# Patient Record
Sex: MALE | Race: WHITE
[De-identification: names, ages, dates, MRNs, and addresses within clinical notes are randomized per-mention and may not be internally consistent; named-entity substitution may affect disease eponyms.]

---

## 2018-05-04 ENCOUNTER — HOSPITAL ENCOUNTER (EMERGENCY)
Dept: HOSPITAL 25 - ED | Age: 28
LOS: 1 days | Discharge: HOME | End: 2018-05-05
Payer: COMMERCIAL

## 2018-05-04 DIAGNOSIS — M54.5: Primary | ICD-10-CM

## 2018-05-04 DIAGNOSIS — Z87.891: ICD-10-CM

## 2018-05-04 PROCEDURE — 99284 EMERGENCY DEPT VISIT MOD MDM: CPT

## 2018-05-05 VITALS — SYSTOLIC BLOOD PRESSURE: 128 MMHG | DIASTOLIC BLOOD PRESSURE: 65 MMHG

## 2018-05-05 NOTE — ED
Virgil RIVERA Nikita, scribed for Devi Snyder MD on 05/04/18 at 2245 .





Back Pain





- HPI Summary


HPI Summary: 


This patient is a 27 year old M BIBA to ED with a chief complaint of lower back 

pain since 3 days ago. The CC is described as sharp, non-radiating, and 

worsened since onset. The patient rates the pain 10/10 in severity. Symptoms 

aggravated by movement and walking. Symptoms alleviated by nothing (patient 

took 2 ibuprofen in the AM, 2 Advil 1 hour ago). Patient denies urinary problems

, focal weakness, and numbness. In 2010, patient reports similar pain due to 

strenuous activity but has resolved then.





- History of Current Complaint


Chief Complaint: EDBackInjuryPain


Stated Complaint: BACK PAIN


Time Seen by Provider: 05/04/18 22:27


Hx Obtained From: Patient


Onset/Duration: Sudden Onset, Lasting Days - 3 days ago, Still Present, Worse 

Since - onset


Onset/Duration: Started Days Ago - 3 days ago, Still Present, Worse Since - 

onset


Timing: Constant, Lasting Days - 3 days ago


Back Pain Location: Is Discrete @ - lower back


Severity Initially: Severe


Severity Currently: Severe


Pain Intensity: 10


Pain Scale Used: 0-10 Numeric


Character: Sharp


Aggravating Symptom(s): Movement, Walking


Alleviating Symptom(s): Nothing - patient took 2 ibuprofen in the AM, 2 Advil 1 

hour ago


Associated Signs And Symptoms: Positive: Other - Patient denies urinary problems

, focal weakness, and numbness.





- Allergies/Home Medications


Allergies/Adverse Reactions: 


 Allergies











Allergy/AdvReac Type Severity Reaction Status Date / Time


 


No Known Allergies Allergy   Verified 05/04/18 22:24














PMH/Surg Hx/FS Hx/Imm Hx


Endocrine/Hematology History: 


   Denies: Hx Diabetes


Cardiovascular History: 


   Denies: Hx Coronary Artery Disease, Hx Hypertension


Musculoskeletal History: Reports: Hx of Fracture(s) - leg





- Immunization History


Date of Tetanus Vaccine: utd


Date of Influenza Vaccine: fall 2017


Infectious Disease History: No


Infectious Disease History: 


   Denies: Traveled Outside the [place] in Last 30 Days





- Family History


Known Family History: Positive: Hypertension


   Negative: Cardiac Disease, Diabetes





- Social History


Alcohol Use: Weekly


Substance Use Type: Reports: Marijuana


Substance Use Comment - Amount & Last Used: last night


Smoking Status (MU): Former Smoker





Review of Systems


Positive: no symptoms reported


Positive: Other - lower back pain


Neurological: Other - denies focal weakness or numbness


All Other Systems Reviewed And Are Negative: Yes





Physical Exam





- Summary


Physical Exam Summary: 


VITAL SIGNS: Reviewed.


GENERAL: ~Patient is a well-developed and nourished MALE who is lying 

comfortable in the stretcher. Patient is not in any acute respiratory distress.


HEAD AND FACE: No signs of trauma. No ecchymosis, hematomas or skull 

depressions. No sinus tenderness.


EYES: PERRLA, EOMI x 2, No injected conjunctiva, no nystagmus.


EARS: Hearing grossly intact. Ear canals and tympanic membranes are within 

normal limits.


MOUTH: Oropharynx within normal limits.


NECK: Supple, trachea is midline, no adenopathy, no JVD, no carotid bruit, no c-

spine tenderness, neck with full ROM.


CHEST: Symmetric, no tenderness at palpation


LUNGS: Clear to auscultation bilaterally. No wheezing or crackles.


CVS: Regular rate and rhythm, S1 and S2 present, no murmurs or gallops 

appreciated.


ABDOMEN: Soft, non-tender. No signs of distention. No rebound, no guarding, and 

no masses palpated. Bowel sounds are normal.


EXTREMITIES: No edema, no cyanosis or clubbing. Bilateral straight leg test is 

30 degrees on both sides. 


MUSCULOSKELETAL: No lumbar cervical tenderness.


NEURO: Alert and oriented x 3. No acute neurological deficits. Speech is normal 

and follows commands.


SKIN: Dry and warm


Triage Information Reviewed: Yes


Vital Signs On Initial Exam: 


 Initial Vitals











Temp Pulse Resp BP Pulse Ox


 


 99.4 F   65   16   105/51   100 


 


 05/04/18 22:23  05/04/18 22:23  05/04/18 22:23  05/04/18 22:23  05/04/18 22:23











Vital Signs Reviewed: Yes





Diagnostics





- Vital Signs


 Vital Signs











  Temp Pulse Resp BP Pulse Ox


 


 05/04/18 22:31   55   105/51  100


 


 05/04/18 22:27   68    100


 


 05/04/18 22:23  99.4 F  65  16  105/51  100














- Laboratory


Lab Statement: Any lab studies that have been ordered have been reviewed, and 

results considered in the medical decision making process.





Re-Evaluation





- Re-Evaluation


  ** First Eval


Re-Evaluation Time: 23:40


Comment: This patient feels a bit better.





Back Pain Course/Dx





- Course


Assessment/Plan: This patient is a 27 year old M BIBA to ED with a chief 

complaint of lower back pain since 3 days ago. In the ED course, the patient 

was given Toradol and Flexeril. The patient was walking here in the ED. He will 

be discharged.





- Diagnoses


Differential Diagnosis/HQI/PQRI: Positive: Other - low back pain


Provider Diagnoses: 


 Low back pain








Discharge





- Sign-Out/Discharge


Documenting (check all that apply): Discharge/Admit/Transfer





- Discharge Plan


Condition: Stable


Disposition: HOME


Prescriptions: 


Cyclobenzaprine TAB* [Flexeril 10 MG TAB*] 10 mg PO TID PRN #20 tab


 PRN Reason: Spasms - Back


Ibuprofen TAB* [Motrin TAB* 800 MG] 800 mg PO Q6H PRN #30 tab


 PRN Reason: Pain - Back


Patient Education Materials:  Acute Low Back Pain (ED)


Referrals: 


[facility] [Provider Group] (Follow up with your PCP in 1-2 

days.)


Additional Instructions: 


RETURN TO EMERGENCY DEPARTMENT FOR ANY NEW OR WORSENING SYMPTOMS.





- Billing Disposition and Condition


Condition: STABLE


Disposition: HOME





The documentation as recorded by the Virgil andrade Nikita accurately reflects the 

service I personally performed and the decisions made by me, Devi Snyder MD.